# Patient Record
Sex: FEMALE | Race: WHITE | NOT HISPANIC OR LATINO | Employment: FULL TIME | URBAN - METROPOLITAN AREA
[De-identification: names, ages, dates, MRNs, and addresses within clinical notes are randomized per-mention and may not be internally consistent; named-entity substitution may affect disease eponyms.]

---

## 2023-11-22 ENCOUNTER — OFFICE VISIT (OUTPATIENT)
Dept: DERMATOLOGY | Facility: CLINIC | Age: 43
End: 2023-11-22
Payer: COMMERCIAL

## 2023-11-22 DIAGNOSIS — L81.4 LENTIGO: ICD-10-CM

## 2023-11-22 DIAGNOSIS — L82.1 SEBORRHEIC KERATOSIS: ICD-10-CM

## 2023-11-22 DIAGNOSIS — D18.01 HEMANGIOMA OF SKIN: ICD-10-CM

## 2023-11-22 DIAGNOSIS — L71.9 ROSACEA: ICD-10-CM

## 2023-11-22 DIAGNOSIS — L30.8 OTHER ECZEMA: Primary | ICD-10-CM

## 2023-11-22 DIAGNOSIS — D22.9 MULTIPLE BENIGN NEVI: ICD-10-CM

## 2023-11-22 PROCEDURE — 99214 OFFICE O/P EST MOD 30 MIN: CPT | Performed by: DERMATOLOGY

## 2023-11-22 RX ORDER — IVERMECTIN 10 MG/G
CREAM TOPICAL
Qty: 30 G | Refills: 3 | Status: SHIPPED | OUTPATIENT
Start: 2023-11-22

## 2023-11-22 RX ORDER — TRIAMCINOLONE ACETONIDE 1 MG/G
CREAM TOPICAL
Qty: 15 G | Refills: 0 | Status: SHIPPED | OUTPATIENT
Start: 2023-11-22

## 2023-11-22 RX ORDER — BISMUTH SUBSALICYLATE 262 MG
1 TABLET,CHEWABLE ORAL DAILY
COMMUNITY

## 2023-11-22 NOTE — PROGRESS NOTES
Subjective     Esther Omalley is a 42 y.o. female who presents for the following: Eczema (Postauricular - states used a rx cream that is unsure name. ) and Suspicious Skin Lesion (Upper back lesion and left anterior upper leg lesion. ).     Review of Systems:  No other skin or systemic complaints other than what is documented elsewhere in the note.    The following portions of the chart were reviewed this encounter and updated as appropriate:  Allergies  Meds  Problems  Med Hx  Surg Hx  Fam Hx         Skin Cancer History  No skin cancer on file.      Specialty Problems    None       Objective     Well appearing patient in no apparent distress; mood and affect are within normal limits.    A full examination was performed including scalp, face, neck, chest, abdomen, back, bilateral upper extremities, bilateral lower extremities. Patient declines exam underneath the underwear; patient declines exam of the lower abdomen/mons pubis, buttocks, groin, genitalia, perineal and perianal skin and these areas were not examined. The patient declines removal of the socks and the feet and lower legs were not examined.     All findings within normal limits unless otherwise noted below.    Assessment/Plan   1. Other eczema  Left Postauricular Area  Erythematous, eczematous lichenified plaque    -Discussed nature of condition, consistent with eczematous dermatitis  -Reviewed treatment options  -Recommend triamcinolone twice daily as needed    -Discussed with/information given to the patient on the risks, benefits and alternatives of the usage of topical corticosteroids, including but not limited to: atrophy (thinning of the skin), striae (stretch marks), telangiectasia (blood vessel growth), and dyspigmentation (discoloration of the skin).  -Recommend to limit long-term use of topical corticosteroids to less than 14 days per month to reduce risk of side effects.      Related Medications  triamcinolone (Kenalog) 0.1 % cream  Apply  to affected areas twice daily when active as needed. Use less than 14 days per month.    2. Rosacea  Head - Anterior (Face)  Erythema and telangiectasias    Flaring off therapy  -Discussed nature of this chronic condition  -Recommend gentle skin care habits including gentle cleansers, non-comedogenic physical/mineral based sunscreen daily. Avoid exfoliation, wind and extreme temperatures when possible.  -Insurance will not cover medications  -Start triple cream from [facility] once daily  -The medication was sent to [facility]. The patient may contact the pharmacy at [phone] or via email at [email or web address] to provide information to have the medication shipped to their home. This is an out of pocket expense and will not be covered by insurance.     Related Medications  ivermectin 1 % cream  SM4 Azelaic Acid 15% / Metronidazole 1% / Ivermectin 1% Cream Apply to affected areas once daily    3. Multiple benign nevi  Brown and tan macules and papules with reassuring findings on dermoscopy    -These lesions have benign, reassuring patterns on dermoscopy  -Recommend continued self observation, and to contact the office if any changes in nevi are noticed    4. Lentigo  Tan macules    -Benign appearing on exam  -Reassurance, recommend observation    5. Seborrheic keratosis  Stuck on, waxy macule(s)/papule(s)/plaque(s) with comedo-like openings and milia like cysts    -Discussed the nature of the diagnosis  -Reassurance, recommend continued observation    6. Hemangioma of skin  Cherry red papules    -Discussed the nature of the diagnosis  -Reassurance, recommend continued observation        Discussed/information given on safe sun practices and use of sunscreen, sun protective clothing or sun avoidance. Recommend to use over the counter medication of sunscreen with a SPF 30 or higher on a daily basis prior to sun exposure to reduce the risk of skin cancer.    Follow up in 1 year for  FSE  Discussed if there are any changes or development of concerning symptoms (lesion/skin condition is changing, bleeding, enlarging, or worsening) the patient is to contact my office. The patient verbalizes understanding.     Isamar Ceballos MD  11/22/2023

## 2025-03-17 ENCOUNTER — APPOINTMENT (OUTPATIENT)
Dept: DERMATOLOGY | Facility: CLINIC | Age: 45
End: 2025-03-17
Payer: COMMERCIAL

## 2025-03-17 DIAGNOSIS — H01.132 ECZEMATOUS DERMATITIS OF UPPER AND LOWER EYELIDS OF BOTH EYES: Primary | ICD-10-CM

## 2025-03-17 DIAGNOSIS — L81.4 LENTIGO: ICD-10-CM

## 2025-03-17 DIAGNOSIS — H01.135 ECZEMATOUS DERMATITIS OF UPPER AND LOWER EYELIDS OF BOTH EYES: Primary | ICD-10-CM

## 2025-03-17 DIAGNOSIS — D22.9 MULTIPLE BENIGN NEVI: ICD-10-CM

## 2025-03-17 DIAGNOSIS — D18.01 HEMANGIOMA OF SKIN: ICD-10-CM

## 2025-03-17 DIAGNOSIS — Z12.83 SCREENING EXAM FOR SKIN CANCER: ICD-10-CM

## 2025-03-17 DIAGNOSIS — L71.9 ROSACEA: ICD-10-CM

## 2025-03-17 DIAGNOSIS — H01.131 ECZEMATOUS DERMATITIS OF UPPER AND LOWER EYELIDS OF BOTH EYES: Primary | ICD-10-CM

## 2025-03-17 DIAGNOSIS — L82.1 SEBORRHEIC KERATOSIS: ICD-10-CM

## 2025-03-17 DIAGNOSIS — H01.134 ECZEMATOUS DERMATITIS OF UPPER AND LOWER EYELIDS OF BOTH EYES: Primary | ICD-10-CM

## 2025-03-17 PROCEDURE — 99214 OFFICE O/P EST MOD 30 MIN: CPT | Performed by: DERMATOLOGY

## 2025-03-17 RX ORDER — DROSPIRENONE AND ETHINYL ESTRADIOL 0.03MG-3MG
1 KIT ORAL DAILY
COMMUNITY

## 2025-03-17 RX ORDER — TACROLIMUS 1 MG/G
OINTMENT TOPICAL
Qty: 30 G | Refills: 0 | Status: SHIPPED | OUTPATIENT
Start: 2025-03-17

## 2025-03-17 RX ORDER — IBUPROFEN 600 MG/1
TABLET ORAL
COMMUNITY

## 2025-03-17 NOTE — PROGRESS NOTES
Subjective     Esther Omalley is a 44 y.o. female who presents for the following: Skin Check (Pt denies personal history of NMSC. Pt declines chaperone. ).     Review of Systems:  No other skin or systemic complaints other than what is documented elsewhere in the note.    The following portions of the chart were reviewed this encounter and updated as appropriate:  Allergies  Meds  Problems  Med Hx  Surg Hx  Fam Hx                Objective     Well appearing patient in no apparent distress; mood and affect are within normal limits.    A full examination was performed including scalp, face, neck, chest, abdomen, back, bilateral upper extremities, bilateral lower extremities, buttocks, external genitalia. Patient declines exam of the perineal and perianal skin and these areas were not examined.      All findings within normal limits unless otherwise noted below.    Assessment/Plan   1. 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  -Recommend to start tacrolimus ointment twice daily when needed  -Recommend:     tacrolimus (Protopic) 0.1 % ointment - Left Upper Eyelid  Apply to affected areas twice daily when needed for maintenance.    2. Rosacea  Head - Anterior (Face)  Erythema and telangiectasias    -Insurance will not cover medications  -May continue triple cream from [facility] once daily if desired    Related Medications  ivermectin 1 % cream  SM4 Azelaic Acid 15% / Metronidazole 1% / Ivermectin 1% Cream Apply to affected areas once daily    3. Multiple benign nevi  Brown and tan macules and papules with reassuring findings on dermoscopy    -These lesions have benign, reassuring patterns on dermoscopy  -Recommend continued self observation, and to contact the office if any changes in nevi are noticed    4. Lentigo  Tan macules    -Benign  appearing on exam  -Reassurance, recommend observation    5. Seborrheic keratosis  Stuck on, waxy macule(s)/papule(s)/plaque(s) with comedo-like openings and milia like cysts    -Discussed the nature of the diagnosis  -Reassurance, recommend continued observation    6. Hemangioma of skin  Cherry red papules    -Discussed the nature of the diagnosis  -Reassurance, recommend continued observation    7. Screening exam for skin cancer    Related Procedures  Follow Up In Dermatology - Established Patient        Discussed/information given on safe sun practices and use of sunscreen, sun protective clothing or sun avoidance. Recommend to use over the counter medication of sunscreen with a SPF 30 or higher on a daily basis prior to sun exposure to reduce the risk of skin cancer.    Follow up in 1 year for FSE  Discussed if there are any changes or development of concerning symptoms (lesion/skin condition is changing, bleeding, enlarging, or worsening) the patient is to contact my office. The patient verbalizes understanding.     Isamar Ceballos MD  3/17/2025

## 2025-04-14 DIAGNOSIS — H01.134 ECZEMATOUS DERMATITIS OF UPPER AND LOWER EYELIDS OF BOTH EYES: ICD-10-CM

## 2025-04-14 DIAGNOSIS — H01.131 ECZEMATOUS DERMATITIS OF UPPER AND LOWER EYELIDS OF BOTH EYES: ICD-10-CM

## 2025-04-14 DIAGNOSIS — H01.135 ECZEMATOUS DERMATITIS OF UPPER AND LOWER EYELIDS OF BOTH EYES: ICD-10-CM

## 2025-04-14 DIAGNOSIS — H01.132 ECZEMATOUS DERMATITIS OF UPPER AND LOWER EYELIDS OF BOTH EYES: ICD-10-CM

## 2025-04-14 RX ORDER — TACROLIMUS 1 MG/G
OINTMENT TOPICAL
Qty: 30 G | Refills: 0 | Status: SHIPPED | OUTPATIENT
Start: 2025-04-14

## 2025-08-04 ENCOUNTER — APPOINTMENT (OUTPATIENT)
Dept: DERMATOLOGY | Facility: CLINIC | Age: 45
End: 2025-08-04
Payer: COMMERCIAL